# Patient Record
Sex: FEMALE | Race: WHITE | NOT HISPANIC OR LATINO | Employment: OTHER | URBAN - METROPOLITAN AREA
[De-identification: names, ages, dates, MRNs, and addresses within clinical notes are randomized per-mention and may not be internally consistent; named-entity substitution may affect disease eponyms.]

---

## 2020-02-03 NOTE — PROCEDURE NOTE: CLINICAL
PROCEDURE NOTE: Laser for Retinal Tear OD. Diagnosis: Retinal Hole. Anesthesia: Topical. Prior to laser, risks/benefits/alternatives to laser discussed including loss of vision, decreased peripheral and night vision, need for more laser and/or surgery and patient wished to proceed. An informed consent was obtained and no assurances or guarantees were given. Spot size: 200 um. Pulse power: 320 mW. Pulse duration: 100 ms. Number of pulses: 292. Procedure Time: 7:26PM. Patient tolerated procedure well. There were no complications. Post-op instructions given. Patient given office phone number/answering service number and advised to call immediately should there be loss of vision or pain, or should they have any other questions or concerns. Shivani Miller

## 2020-02-03 NOTE — PATIENT DISCUSSION
2 3 20 VERY DIF LASER - PT LIGHT SENSITIVE - SQUEEZING EYE CLOSED - BARRIER FROM 4:30 TO 12:30 PLACE.

## 2020-02-05 NOTE — PROCEDURE NOTE: CLINICAL
PROCEDURE NOTE: Laser for Retinal Tear OS. Diagnosis: Retinal Hole. Anesthesia: Topical. Prior to laser, risks/benefits/alternatives to laser discussed including loss of vision, decreased peripheral and night vision, need for more laser and/or surgery and patient wished to proceed. An informed consent was obtained and no assurances or guarantees were given. Spot size: 200 um. Pulse power: 400 mW. Pulse duration: 100 ms. Number of pulses: 239. Procedure Time: 12:23PM. Patient tolerated procedure well. There were no complications. Post-op instructions given. Patient given office phone number/answering service number and advised to call immediately should there be loss of vision or pain, or should they have any other questions or concerns. Salud Little

## 2020-08-03 NOTE — PATIENT DISCUSSION
Over the last 2 weeks, how often have you been bothered by the following problems?          PHQ2 Score:  1  PHQ2 Score Interpretation:  No further screening needed  1. Little interest or pleasure in activity?:  0  2. Feeling down, depressed, or hopeless?:  1       There are no preventive care reminders to display for this patient.    Patient is up to date, no discussion needed.             [facility] DR JACK[facility]S OFFICE.

## 2020-08-21 ENCOUNTER — NEW PATIENT (OUTPATIENT)
Dept: URBAN - METROPOLITAN AREA CLINIC 33 | Facility: CLINIC | Age: 70
End: 2020-08-21

## 2020-08-21 VITALS
SYSTOLIC BLOOD PRESSURE: 118 MMHG | HEART RATE: 65 BPM | BODY MASS INDEX: 25.69 KG/M2 | DIASTOLIC BLOOD PRESSURE: 84 MMHG | HEIGHT: 63 IN | WEIGHT: 145 LBS

## 2020-08-21 DIAGNOSIS — H04.123: ICD-10-CM

## 2020-08-21 DIAGNOSIS — H43.811: ICD-10-CM

## 2020-08-21 DIAGNOSIS — Z98.890: ICD-10-CM

## 2020-08-21 DIAGNOSIS — H02.831: ICD-10-CM

## 2020-08-21 DIAGNOSIS — H02.834: ICD-10-CM

## 2020-08-21 DIAGNOSIS — H43.393: ICD-10-CM

## 2020-08-21 DIAGNOSIS — H35.371: ICD-10-CM

## 2020-08-21 DIAGNOSIS — H35.3132: ICD-10-CM

## 2020-08-21 DIAGNOSIS — H35.373: ICD-10-CM

## 2020-08-21 PROCEDURE — 92134 CPTRZ OPH DX IMG PST SGM RTA: CPT

## 2020-08-21 PROCEDURE — 92250 FUNDUS PHOTOGRAPHY W/I&R: CPT

## 2020-08-21 PROCEDURE — 92004 COMPRE OPH EXAM NEW PT 1/>: CPT

## 2020-08-21 PROCEDURE — 92235 FLUORESCEIN ANGRPH MLTIFRAME: CPT

## 2020-08-21 ASSESSMENT — TONOMETRY
OS_IOP_MMHG: 14
OD_IOP_MMHG: 17

## 2020-08-21 ASSESSMENT — VISUAL ACUITY
OD_SC: 20/20-1
OS_SC: 20/20-2

## 2021-10-15 ENCOUNTER — FOLLOW UP (OUTPATIENT)
Dept: URBAN - METROPOLITAN AREA CLINIC 33 | Facility: CLINIC | Age: 71
End: 2021-10-15

## 2021-10-15 VITALS — BODY MASS INDEX: 25.69 KG/M2 | HEIGHT: 63 IN | WEIGHT: 145 LBS

## 2021-10-15 DIAGNOSIS — H04.123: ICD-10-CM

## 2021-10-15 DIAGNOSIS — H43.393: ICD-10-CM

## 2021-10-15 DIAGNOSIS — H43.811: ICD-10-CM

## 2021-10-15 DIAGNOSIS — H35.3132: ICD-10-CM

## 2021-10-15 DIAGNOSIS — H35.373: ICD-10-CM

## 2021-10-15 PROCEDURE — 92134 CPTRZ OPH DX IMG PST SGM RTA: CPT

## 2021-10-15 PROCEDURE — 92250 FUNDUS PHOTOGRAPHY W/I&R: CPT

## 2021-10-15 PROCEDURE — 92014 COMPRE OPH EXAM EST PT 1/>: CPT

## 2021-10-15 ASSESSMENT — TONOMETRY
OD_IOP_MMHG: 12
OS_IOP_MMHG: 12

## 2021-10-15 ASSESSMENT — VISUAL ACUITY
OD_SC: 20/25-1
OS_SC: 20/20

## 2022-11-17 ENCOUNTER — FOLLOW UP (OUTPATIENT)
Dept: URBAN - METROPOLITAN AREA CLINIC 33 | Facility: CLINIC | Age: 72
End: 2022-11-17

## 2022-11-17 VITALS — HEIGHT: 63 IN | WEIGHT: 145 LBS | BODY MASS INDEX: 25.69 KG/M2

## 2022-11-17 DIAGNOSIS — H04.123: ICD-10-CM

## 2022-11-17 DIAGNOSIS — H43.811: ICD-10-CM

## 2022-11-17 DIAGNOSIS — H35.3132: ICD-10-CM

## 2022-11-17 DIAGNOSIS — H43.393: ICD-10-CM

## 2022-11-17 DIAGNOSIS — H35.373: ICD-10-CM

## 2022-11-17 PROCEDURE — 92134 CPTRZ OPH DX IMG PST SGM RTA: CPT

## 2022-11-17 PROCEDURE — 92250 FUNDUS PHOTOGRAPHY W/I&R: CPT

## 2022-11-17 PROCEDURE — 92014 COMPRE OPH EXAM EST PT 1/>: CPT

## 2022-11-17 ASSESSMENT — TONOMETRY
OS_IOP_MMHG: 13
OD_IOP_MMHG: 18

## 2022-11-17 ASSESSMENT — VISUAL ACUITY
OS_SC: 20/20
OD_SC: 20/20

## 2023-11-16 ENCOUNTER — COMPREHENSIVE EXAM (OUTPATIENT)
Dept: URBAN - METROPOLITAN AREA CLINIC 33 | Facility: CLINIC | Age: 73
End: 2023-11-16

## 2023-11-16 DIAGNOSIS — H43.811: ICD-10-CM

## 2023-11-16 DIAGNOSIS — H35.373: ICD-10-CM

## 2023-11-16 DIAGNOSIS — H43.393: ICD-10-CM

## 2023-11-16 DIAGNOSIS — H40.1110: ICD-10-CM

## 2023-11-16 DIAGNOSIS — H35.3132: ICD-10-CM

## 2023-11-16 DIAGNOSIS — H04.123: ICD-10-CM

## 2023-11-16 PROCEDURE — 92014 COMPRE OPH EXAM EST PT 1/>: CPT

## 2023-11-16 PROCEDURE — 92250 FUNDUS PHOTOGRAPHY W/I&R: CPT

## 2023-11-16 PROCEDURE — 92134 CPTRZ OPH DX IMG PST SGM RTA: CPT

## 2023-11-16 ASSESSMENT — TONOMETRY
OS_IOP_MMHG: 16
OD_IOP_MMHG: 16

## 2023-11-16 ASSESSMENT — VISUAL ACUITY
OS_SC: 20/20
OD_SC: 20/25-2

## 2025-05-01 ENCOUNTER — COMPREHENSIVE EXAM (OUTPATIENT)
Age: 75
End: 2025-05-01

## 2025-05-01 VITALS — WEIGHT: 140 LBS | BODY MASS INDEX: 24.8 KG/M2 | HEIGHT: 63 IN

## 2025-05-01 DIAGNOSIS — H40.1110: ICD-10-CM

## 2025-05-01 DIAGNOSIS — H43.393: ICD-10-CM

## 2025-05-01 DIAGNOSIS — H35.3132: ICD-10-CM

## 2025-05-01 DIAGNOSIS — Z98.890: ICD-10-CM

## 2025-05-01 DIAGNOSIS — H04.123: ICD-10-CM

## 2025-05-01 DIAGNOSIS — H02.831: ICD-10-CM

## 2025-05-01 DIAGNOSIS — H02.834: ICD-10-CM

## 2025-05-01 DIAGNOSIS — H35.373: ICD-10-CM

## 2025-05-01 DIAGNOSIS — H43.811: ICD-10-CM

## 2025-05-01 PROCEDURE — 92014 COMPRE OPH EXAM EST PT 1/>: CPT

## 2025-05-01 PROCEDURE — 92250 FUNDUS PHOTOGRAPHY W/I&R: CPT | Mod: 59

## 2025-05-01 PROCEDURE — 92134 CPTRZ OPH DX IMG PST SGM RTA: CPT
